# Patient Record
Sex: FEMALE | Race: WHITE | NOT HISPANIC OR LATINO | Employment: FULL TIME | URBAN - METROPOLITAN AREA
[De-identification: names, ages, dates, MRNs, and addresses within clinical notes are randomized per-mention and may not be internally consistent; named-entity substitution may affect disease eponyms.]

---

## 2020-03-04 ENCOUNTER — OFFICE VISIT (OUTPATIENT)
Dept: VASCULAR SURGERY | Facility: CLINIC | Age: 50
End: 2020-03-04
Payer: COMMERCIAL

## 2020-03-04 DIAGNOSIS — I83.812 VARICOSE VEINS OF LEFT LOWER EXTREMITY WITH PAIN: Primary | ICD-10-CM

## 2020-03-04 PROCEDURE — 99207 ZZC VEINSOLUTIONS FREE SCREENING: CPT | Performed by: SURGERY

## 2020-03-04 RX ORDER — ALBUTEROL SULFATE 5 MG/ML
5 SOLUTION RESPIRATORY (INHALATION) EVERY 6 HOURS PRN
COMMUNITY

## 2020-03-04 NOTE — LETTER
3/4/2020         RE: Lety Farley  [address]  [place] [address]        Dear Colleague,    Thank you for referring your patient, Lety Farley, to the [facility]. Please see a copy of my visit note below.    [facility] Free screening    Lety Farley is a 50-year-old female referred by Dr. Erick Adams for evaluation of left leg pain and varicose veins.  Lety has had a visible vein in her left leg since age of 10.  This has enlarged slowly with time and begun to move up her leg.  She describes pain as an aching, tiredness and heaviness as well as numbness, worse when she is been on her feet for long periods of time and improving with elevation of the leg.  She also describes swelling of her left ankle on nearly a daily basis and notes fatigue in her leg.  She has not worn compression hose.    She has no history of deep vein thrombosis, superficial thrombophlebitis or significant trauma to her leg.    Her family history is significant for varicose veins in her mother who underwent vein stripping.  Her sister has varicose veins and had deep vein thrombosis with pulmonary emboli on 2 occasions.  She is now on lifelong anticoagulation.  Her father also has varicose veins but has not had thromboembolic episodes to my knowledge.  He did have arterial insufficiency as he was a smoker and a diabetic.    The patient has been tested and was found to be negative for factor V Leiden mutation.    On physical exam she has a 4 mm varicosity over the medial proximal left calf extending cephalad across the pretibial area, posteriorly to the popliteal fossa and then a smaller varicosity coursing up the posterior lateral left thigh to the mid thigh.    I do not appreciate significant edema of her ankle nor are there any significant venous stasis changes.    She has normal dorsalis pedis and posterior tibial pulses.    Venous clinical severity score 4, CEAP classification  3    Recommendations  We measured her for compression hose, discussed the pathophysiology of chronic venous insufficiency and encouraged conservative measures at this time including exercise, weight loss, dietary measures and leg elevation when possible.  She understands that if she continues to have symptoms despite conservative measures, a left lower extremity venous duplex ultrasound would be indicated.  She voiced understanding, is in agreement with the plan and will contact us if needed in the future.    ELIJAH Farmer MD        [facility] NEW PATIENT:                Again, thank you for allowing me to participate in the care of your patient.        Sincerely,        Kyler Farmer MD

## 2020-03-04 NOTE — PROGRESS NOTES
[facility] Free screening    Lety Farley is a 50-year-old female referred by Dr. Erick Adams for evaluation of left leg pain and varicose veins.  Lety has had a visible vein in her left leg since age of 10.  This has enlarged slowly with time and begun to move up her leg.  She describes pain as an aching, tiredness and heaviness as well as numbness, worse when she is been on her feet for long periods of time and improving with elevation of the leg.  She also describes swelling of her left ankle on nearly a daily basis and notes fatigue in her leg.  She has not worn compression hose.    She has no history of deep vein thrombosis, superficial thrombophlebitis or significant trauma to her leg.    Her family history is significant for varicose veins in her mother who underwent vein stripping.  Her sister has varicose veins and had deep vein thrombosis with pulmonary emboli on 2 occasions.  She is now on lifelong anticoagulation.  Her father also has varicose veins but has not had thromboembolic episodes to my knowledge.  He did have arterial insufficiency as he was a smoker and a diabetic.    The patient has been tested and was found to be negative for factor V Leiden mutation.    On physical exam she has a 4 mm varicosity over the medial proximal left calf extending cephalad across the pretibial area, posteriorly to the popliteal fossa and then a smaller varicosity coursing up the posterior lateral left thigh to the mid thigh.    I do not appreciate significant edema of her ankle nor are there any significant venous stasis changes.    She has normal dorsalis pedis and posterior tibial pulses.    Venous clinical severity score 4, CEAP classification 3    Recommendations  We measured her for compression hose, discussed the pathophysiology of chronic venous insufficiency and encouraged conservative measures at this time including exercise, weight loss, dietary measures and leg elevation when possible.  She  understands that if she continues to have symptoms despite conservative measures, a left lower extremity venous duplex ultrasound would be indicated.  She voiced understanding, is in agreement with the plan and will contact us if needed in the future.    ELIJAH Farmer MD        [facility] NEW PATIENT:

## 2020-10-07 DIAGNOSIS — I83.812 VARICOSE VEINS OF LEFT LOWER EXTREMITY WITH PAIN: Primary | ICD-10-CM

## 2020-10-21 ENCOUNTER — ANCILLARY PROCEDURE (OUTPATIENT)
Dept: ULTRASOUND IMAGING | Facility: CLINIC | Age: 50
End: 2020-10-21
Attending: SURGERY
Payer: COMMERCIAL

## 2020-10-21 DIAGNOSIS — I83.812 VARICOSE VEINS OF LEFT LOWER EXTREMITY WITH PAIN: ICD-10-CM

## 2020-10-21 PROCEDURE — 93971 EXTREMITY STUDY: CPT | Mod: LT | Performed by: SURGERY

## 2020-10-27 ENCOUNTER — OFFICE VISIT (OUTPATIENT)
Dept: VASCULAR SURGERY | Facility: CLINIC | Age: 50
End: 2020-10-27
Payer: COMMERCIAL

## 2020-10-27 DIAGNOSIS — I83.812 VARICOSE VEINS OF LEFT LOWER EXTREMITY WITH PAIN: Primary | ICD-10-CM

## 2020-10-27 PROCEDURE — 99213 OFFICE O/P EST LOW 20 MIN: CPT | Performed by: SURGERY

## 2020-10-27 RX ORDER — DUTASTERIDE 0.5 MG/1
0.5 CAPSULE, LIQUID FILLED ORAL DAILY
COMMUNITY
End: 2022-05-04

## 2020-10-27 NOTE — PROGRESS NOTES
[facility] office note  Lety Farley returns in follow-up of left leg pain and varicose veins.  I last saw her in March 2020 at which time she began wearing compression hose, exercising and trying to lose weight.  Please see that dictation for other details.  The pain when I saw her in March was in the proximal medial left calf extending anteriorly.  Over the last couple of months, however, pain has been located more on the distal anteromedial left leg extending over the dorsum of her left foot.  It seems to be worse after she has sat in one place for long periods of time and improves when she gets up to walk.  She also admits that she has had the appearance of a vein on the anterior aspect of her left leg that has not been there except over the last few months.    She denies any trauma to her leg, erythema, significant swelling or other acute changes.  The pain that she is describing is occurred despite the use of compression hose.    She feels that the pain is significant enough that it is making it difficult for her to get up and walk as she would like and sometimes make it difficult for her to get to sleep.    Physical exam  General: Pleasant female in no acute distress  Extremities: Left lower extremity: There is a 3 to 4 mm varicosity coursing down the lateral left thigh seeming to and near the knee level.  There is a 4 mm varicosity which seem to reside on the anterolateral proximal left leg, extends across the pretibial area, down the anteromedial leg onto the dorsum of the left foot.  There is no erythema or induration along the vein.  There is tenderness over the anterior ankle and dorsum of the left foot along the course of the vein.    She does have a bunion deformity of her left great toe.    She has 2-3+ dorsalis pedis and posterior tibial pulses in her left foot.    Venous ultrasound  Left lower extremity: No evidence of deep vein thrombosis or deep vein valve incompetence.    The left great  saphenous vein is incompetent at the saphenofemoral junction but otherwise is competent.There is a 4.5 mm diameter incompetent vein branch coursing from the below-knee segment of the left great saphenous vein with reflux time of 3.2 seconds.  This vein courses down to the dorsum of the left foot.    The left small saphenous vein is competent.    The left Vein of Giacomini is incompetent but quite small.    The left anterior accessory saphenous vein is incompetent, measures 4.7 mm diameter with reflux time of 6 seconds.  It supplies several incompetent vein branches measuring up to 3.9 mm in diameter with reflux time of 4.7 seconds.    Impression  CEAP 2, VCSS 5 left 4 extremity venous insufficiency.  She has pursued conservative measures for more than 6 months and feels that the pain over the dorsum of the foot is interfering with her actives of daily living, making it difficult for her to be on her feet for long periods of time and interfere with her ability to be as active as she was like and attempts at weight loss.    The vein coursing down the medial left leg onto the dorsum of the foot is the source of the pain on the dorsum of the foot.  I feel this would be best treated with medical necessary phlebectomies.  The origin of this appears to be from a perforating vein on the anterolateral left leg.  This could be treated with ultrasound-guided sclerotherapy at the same setting.    The incompetent left anterior accessory saphenous vein may be contributing somewhat to this venous hypertension but I am not sure that is a major contributor.  I therefore would focus initially on the varicosity on the dorsum of the foot and the feeding, incompetent .    Details of procedure including risks of bleeding, infection, nerve injury, scarring, hyperpigmentation, deep vein thrombosis, allergic reaction were all discussed.  She voiced understanding and wishes to proceed.  She understands that there is a 20% chance  that her pain will not be relieved at that this could be related to the bunion on her left foot.  Questions were answered.    ELIJAH Farmer MD    Dictated using Dragon voice recognition software which may result in transcription errors

## 2020-10-27 NOTE — Clinical Note
10-20 medic necessary phlebectomies left lower extremity. Medically necessary, ultrasound-guided sclerotherapy left leg varicose vein/incompetent perforating vein left anterolateral leg.  Allow 1 hour for procedure

## 2020-10-27 NOTE — LETTER
10/27/2020         RE: Lety Farley  [address]  [place] [address]        Dear Colleague,    Thank you for referring your patient, Lety Farley, to the [facility] [place]. Please see a copy of my visit note below.    [facility] office note  Lety Farley returns in follow-up of left leg pain and varicose veins.  I last saw her in March 2020 at which time she began wearing compression hose, exercising and trying to lose weight.  Please see that dictation for other details.  The pain when I saw her in March was in the proximal medial left calf extending anteriorly.  Over the last couple of months, however, pain has been located more on the distal anteromedial left leg extending over the dorsum of her left foot.  It seems to be worse after she has sat in one place for long periods of time and improves when she gets up to walk.  She also admits that she has had the appearance of a vein on the anterior aspect of her left leg that has not been there except over the last few months.    She denies any trauma to her leg, erythema, significant swelling or other acute changes.  The pain that she is describing is occurred despite the use of compression hose.    She feels that the pain is significant enough that it is making it difficult for her to get up and walk as she would like and sometimes make it difficult for her to get to sleep.    Physical exam  General: Pleasant female in no acute distress  Extremities: Left lower extremity: There is a 3 to 4 mm varicosity coursing down the lateral left thigh seeming to and near the knee level.  There is a 4 mm varicosity which seem to reside on the anterolateral proximal left leg, extends across the pretibial area, down the anteromedial leg onto the dorsum of the left foot.  There is no erythema or induration along the vein.  There is tenderness over the anterior ankle and dorsum of the left foot along the course of the vein.    She does have a  bunion deformity of her left great toe.    She has 2-3+ dorsalis pedis and posterior tibial pulses in her left foot.    Venous ultrasound  Left lower extremity: No evidence of deep vein thrombosis or deep vein valve incompetence.    The left great saphenous vein is incompetent at the saphenofemoral junction but otherwise is competent.There is a 4.5 mm diameter incompetent vein branch coursing from the below-knee segment of the left great saphenous vein with reflux time of 3.2 seconds.  This vein courses down to the dorsum of the left foot.    The left small saphenous vein is competent.    The left Vein of Giacomini is incompetent but quite small.    The left anterior accessory saphenous vein is incompetent, measures 4.7 mm diameter with reflux time of 6 seconds.  It supplies several incompetent vein branches measuring up to 3.9 mm in diameter with reflux time of 4.7 seconds.    Impression  CEAP 2, VCSS 5 left 4 extremity venous insufficiency.  She has pursued conservative measures for more than 6 months and feels that the pain over the dorsum of the foot is interfering with her actives of daily living, making it difficult for her to be on her feet for long periods of time and interfere with her ability to be as active as she was like and attempts at weight loss.    The vein coursing down the medial left leg onto the dorsum of the foot is the source of the pain on the dorsum of the foot.  I feel this would be best treated with medical necessary phlebectomies.  The origin of this appears to be from a perforating vein on the anterolateral left leg.  This could be treated with ultrasound-guided sclerotherapy at the same setting.    The incompetent left anterior accessory saphenous vein may be contributing somewhat to this venous hypertension but I am not sure that is a major contributor.  I therefore would focus initially on the varicosity on the dorsum of the foot and the feeding, incompetent .    Details of  procedure including risks of bleeding, infection, nerve injury, scarring, hyperpigmentation, deep vein thrombosis, allergic reaction were all discussed.  She voiced understanding and wishes to proceed.  She understands that there is a 20% chance that her pain will not be relieved at that this could be related to the bunion on her left foot.  Questions were answered.    ELIJAH Farmer MD    Dictated using Dragon voice recognition software which may result in transcription errors          Again, thank you for allowing me to participate in the care of your patient.        Sincerely,        Kyler Farmer MD

## 2020-12-20 ENCOUNTER — HEALTH MAINTENANCE LETTER (OUTPATIENT)
Age: 50
End: 2020-12-20

## 2021-08-04 DIAGNOSIS — I83.812 VARICOSE VEINS OF LEFT LOWER EXTREMITY WITH PAIN: Primary | ICD-10-CM

## 2021-08-12 ENCOUNTER — ANCILLARY PROCEDURE (OUTPATIENT)
Dept: ULTRASOUND IMAGING | Facility: CLINIC | Age: 51
End: 2021-08-12
Attending: SURGERY
Payer: COMMERCIAL

## 2021-08-12 DIAGNOSIS — I83.812 VARICOSE VEINS OF LEFT LOWER EXTREMITY WITH PAIN: ICD-10-CM

## 2021-08-12 PROCEDURE — 93971 EXTREMITY STUDY: CPT | Mod: LT | Performed by: SURGERY

## 2021-08-26 ENCOUNTER — VIRTUAL VISIT (OUTPATIENT)
Dept: VASCULAR SURGERY | Facility: CLINIC | Age: 51
End: 2021-08-26
Attending: SURGERY
Payer: COMMERCIAL

## 2021-08-26 DIAGNOSIS — I83.812 VARICOSE VEINS OF LEFT LOWER EXTREMITY WITH PAIN: Primary | ICD-10-CM

## 2021-08-26 PROCEDURE — 99213 OFFICE O/P EST LOW 20 MIN: CPT | Mod: 95 | Performed by: SURGERY

## 2021-08-26 NOTE — LETTER
8/26/2021         RE: Lety Farley  [address]  [place] [address]        Dear Colleague,    Thank you for referring your patient, Lety Farley, to the [facility] [place]. Please see a copy of my visit note below.    Cecilia is a 51 year old who is being evaluated via a billable video visit.      How would you like to obtain your AVS? MyChart  If the video visit is dropped, the invitation should be resent by: Text to cell phone: [phone]  Will anyone else be joining your video visit? No      Video Start Time: 4:01 PM          Video-Visit Details    Type of service:  Video Visit    Video End Time:4:12 PM    Originating Location (pt. Location): Home    Distant Location (provider location):  [facility]     Platform used for Video Visit: [employer]     [facility] [place] Progress Note    Lety Farley presents in follow-up of left lower extremity pain and varicose veins. Please see my clinic notes of 10/27/2020 and 3/4/2020 for details. We had planned procedure to treat her left lower extremity varicosities but she lost her insurance for period of time and had to postpone treatment. Because her last ultrasound was more than 6 months ago, she returned on 8/12/2021 for left lower extremity venous competency study, the results of which we will discuss on today's video visit.    Physical Exam  General: Pleasant female in no acute distress.  Blood pressure 156/93, pulse 91  Extremities: Left lower extremity: 3 to 4 mm varicosity coursing from the anterolateral left thigh, lateral to the left knee. 4 mm varicosity coursing from the proximal anterolateral left calf extending cephalad across the pretibial area, posteriorly to the popliteal fossa and then a smaller varicosity coursing up the posterior lateral left thigh to the mid thigh. There is a varicosity on the medial calf coursing down the pretibial area to the dorsum of  the left foot.  Trace edema of the left ankle. No stasis changes    Ultrasound:  Left lower extremity: No deep vein thrombosis or deep vein valve incompetence.  Left great saphenous vein is incompetent at the saphenofemoral junction and in the mid thigh, measures 5.4 mm in diameter with reflux time of 1.6 seconds. Is a source of a 4.2 mm diameter incompetent vein branch coursing from the mid thigh with reflux time of 4.7 seconds.  The left small saphenous vein is incompetent in the proximal calf but otherwise is competent. It is quite small.  The left anterior accessory saphenous vein is incompetent, measures 6 mm in diameter with reflux time of 5.4 seconds. Is a source of 4.8 Demeter diameter incompetent vein branch with reflux time of 4.8 seconds.  There are no significant incompetent perforators.    Assessment:  Left leg pain and varicose veins with symptoms that have persisted despite the use of compression hose, leg elevation, dietary measures and exercise for more than 6 months. She feels the symptoms interfere with actives daily living, making it difficult to be on her feet for long periods of time, especially toward the end of the day.    The option of continued conservative measures as above were discussed. She does not feel this can be a good option for her and she wishes to have definitive treatment.    She is a candidate for endovenous ablation of the left great saphenous and accessory saphenous veins with phlebectomies. Details procedure including risks of bleeding, infection, nerve injury, scarring, hyperpigmentation, deep vein thrombosis, recanalization of the great saphenous or anterior sensory saphenous veins and recurrent varicose veins were discussed. She voiced understanding and her questions were answered.    Plan:  Please see above    Kyler Farmer MD    Dictated using Dragon voice recognition software which may result in transcription errors              Again, thank you for  allowing me to participate in the care of your patient.        Sincerely,        Kyler Farmer MD

## 2021-08-26 NOTE — PROGRESS NOTES
Cecilia is a 51 year old who is being evaluated via a billable video visit.      How would you like to obtain your AVS? MyChart  If the video visit is dropped, the invitation should be resent by: Text to cell phone: [phone]  Will anyone else be joining your video visit? No      Video Start Time: 4:01 PM          Video-Visit Details    Type of service:  Video Visit    Video End Time:4:12 PM    Originating Location (pt. Location): Home    Distant Location (provider location):  [facility] [place]     Platform used for Video Visit: [employer]     [facility] [place] Progress Note    Lety Farley presents in follow-up of left lower extremity pain and varicose veins. Please see my clinic notes of 10/27/2020 and 3/4/2020 for details. We had planned procedure to treat her left lower extremity varicosities but she lost her insurance for period of time and had to postpone treatment. Because her last ultrasound was more than 6 months ago, she returned on 8/12/2021 for left lower extremity venous competency study, the results of which we will discuss on today's video visit.    Physical Exam  General: Pleasant female in no acute distress.  Blood pressure 156/93, pulse 91  Extremities: Left lower extremity: 3 to 4 mm varicosity coursing from the anterolateral left thigh, lateral to the left knee. 4 mm varicosity coursing from the proximal anterolateral left calf extending cephalad across the pretibial area, posteriorly to the popliteal fossa and then a smaller varicosity coursing up the posterior lateral left thigh to the mid thigh. There is a varicosity on the medial calf coursing down the pretibial area to the dorsum of the left foot.  Trace edema of the left ankle. No stasis changes    Ultrasound:  Left lower extremity: No deep vein thrombosis or deep vein valve incompetence.  Left great saphenous vein is incompetent at the saphenofemoral junction and in the mid thigh, measures  5.4 mm in diameter with reflux time of 1.6 seconds. Is a source of a 4.2 mm diameter incompetent vein branch coursing from the mid thigh with reflux time of 4.7 seconds.  The left small saphenous vein is incompetent in the proximal calf but otherwise is competent. It is quite small.  The left anterior accessory saphenous vein is incompetent, measures 6 mm in diameter with reflux time of 5.4 seconds. Is a source of 4.8 Demeter diameter incompetent vein branch with reflux time of 4.8 seconds.  There are no significant incompetent perforators.    Assessment:  Left leg pain and varicose veins with symptoms that have persisted despite the use of compression hose, leg elevation, dietary measures and exercise for more than 6 months. She feels the symptoms interfere with actives daily living, making it difficult to be on her feet for long periods of time, especially toward the end of the day.    The option of continued conservative measures as above were discussed. She does not feel this can be a good option for her and she wishes to have definitive treatment.    She is a candidate for endovenous ablation of the left great saphenous and accessory saphenous veins with phlebectomies. Details procedure including risks of bleeding, infection, nerve injury, scarring, hyperpigmentation, deep vein thrombosis, recanalization of the great saphenous or anterior sensory saphenous veins and recurrent varicose veins were discussed. She voiced understanding and her questions were answered.    Plan:  Please see above    Kyler Farmer MD    Dictated using Dragon voice recognition software which may result in transcription errors

## 2021-09-01 ENCOUNTER — TELEPHONE (OUTPATIENT)
Dept: VASCULAR SURGERY | Facility: CLINIC | Age: 51
End: 2021-09-01

## 2021-09-01 NOTE — TELEPHONE ENCOUNTER
Called and LDVM regarding process of insurance submission. Informed patient this process could take up to 14 business days, but once approved, you will be contacted to schedule your procedure.    Further documentation of this process will be documented in the referral.    Madiha Nieves  [facility]

## 2021-09-10 DIAGNOSIS — I83.812 VARICOSE VEINS OF LEFT LOWER EXTREMITY WITH PAIN: Primary | ICD-10-CM

## 2021-09-28 ENCOUNTER — TELEPHONE (OUTPATIENT)
Dept: VASCULAR SURGERY | Facility: CLINIC | Age: 51
End: 2021-09-28

## 2021-09-28 DIAGNOSIS — I83.812 VARICOSE VEINS OF LEFT LOWER EXTREMITY WITH PAIN: Primary | ICD-10-CM

## 2021-09-28 NOTE — TELEPHONE ENCOUNTER
[facility] Clinic Preoperative Nurse Call    Procedure: Left leg VNUS closure GSV, ASV (med nec) 10-20 stab phlebs (med nec)  Date: 10/7/2021  Surgeon: ELIJAH Farmer MD  Time: 0900  Check in time: 0800    Called and spoke to patient. Informed patient: when to check in (0800) to sign consent, to bring their preop medications in their original bottle with them (3mg ativan, 0.1mg clonidine). Patient will take the medications after signing the consent to the procedure. Instructed patient to wear a mask, wear loose-fitting comfortable clothing, and bring their compression hose. Ensured patient has a /someone that will be responsible for them the rest of the day. Visitors are allowed in the clinic, but they would need to stay in an exam room or wait in the parking lot or leave. If  does leave, IF POSSIBLE, we ask that they do not go more than 15-20 mins from our clinic. Once procedure is completed, we will keep patient in recovery for 30-45 mins, and call  with aftercare instructions. Informed patient, that if possible, they should sit in the backseat to elevate their leg on the ride home.    Pt needs thigh-high compression hose for procedure. Status of the hose: patient is checking with insurance and is asking for dx code.Code given to patient . Instructed pt she will need to have compression hose for Saturday (48 hour after procedure)    Special instructions: none     Special COVID-19 instructions: Patient aware they need to wear a mask to our clinic and during their procedure. Patient aware that their  can come in with them, but would need to stay in an exam room during the procedure or wait in the parking lot or leave. Nurse will call pt's  when procedure is over.    Patient understands that if they have any of the following symptoms (fever, cough, shortness of breath, rash), they need to notify us immediately to cancel their procedure and will have to reschedule for a later  date.    Patient is in agreement with preoperative information and has no further questions.    Victoriano Renteria, RN  9/28/2021

## 2021-09-29 ENCOUNTER — TELEPHONE (OUTPATIENT)
Dept: VASCULAR SURGERY | Facility: CLINIC | Age: 51
End: 2021-09-29

## 2021-09-29 DIAGNOSIS — I83.812 VARICOSE VEINS OF LEFT LOWER EXTREMITY WITH PAIN: Primary | ICD-10-CM

## 2021-09-29 RX ORDER — CLONIDINE HYDROCHLORIDE 0.1 MG/1
TABLET ORAL
Qty: 1 TABLET | Refills: 0 | Status: SHIPPED | OUTPATIENT
Start: 2021-09-29 | End: 2021-10-11

## 2021-09-29 RX ORDER — LORAZEPAM 1 MG/1
TABLET ORAL
Qty: 3 TABLET | Refills: 0 | Status: SHIPPED | OUTPATIENT
Start: 2021-09-29 | End: 2021-10-11

## 2021-09-29 NOTE — TELEPHONE ENCOUNTER
See other phone note from 9/29/21 - faxed pt's compression hose Rx to [facility] per pt request.    Kathy Mcguire, NIKAN, RN  [facility]  Vein Clinic

## 2021-09-29 NOTE — TELEPHONE ENCOUNTER
Patient checked with her insurance company. She wants to order two pair of thigh high hose,beige. Open toe. Having surgery on 10/07/21 at . Thanks

## 2021-09-29 NOTE — TELEPHONE ENCOUNTER
Per pt request, faxed their compression hose Rx to [facility] at [phone].   Pt would like 2 pair(s) of natural, open-toe, thigh high, 20-30mmhg compression hose. Fax confirmed.          Pt aware they will be shipped to their home address.    Kathy Mcguire RN

## 2021-09-29 NOTE — TELEPHONE ENCOUNTER
Called pt back and informed her per Juju at the [facility], pt needs custom thigh high stockings and she could order the [employer] custom stockings but they wouldn't get to pt for about 2-3 weeks. Per pt, she is okay to order a pair but ONLY 1 PAIR of the thigh high stockings so she has them at home even though she won't get them in time for her procedure. And then pt stated her sister has a couple thigh high hose that may fit her so she will look into that for after the procedure when the ACE wrap is off. Otherwise, informed pt that she can re-use the ACE wrap - as last resort - from toes to groin.    Pt in agreement with plan and will discuss further with nurse on the day of procedure. Pt does not have any other questions at this time.    Kathy Mcguire, NIKAN, RN  [facility]  Vein Clinic

## 2021-10-03 ENCOUNTER — HEALTH MAINTENANCE LETTER (OUTPATIENT)
Age: 51
End: 2021-10-03

## 2021-10-07 ENCOUNTER — OFFICE VISIT (OUTPATIENT)
Dept: VASCULAR SURGERY | Facility: CLINIC | Age: 51
End: 2021-10-07
Payer: COMMERCIAL

## 2021-10-07 VITALS — DIASTOLIC BLOOD PRESSURE: 52 MMHG | SYSTOLIC BLOOD PRESSURE: 118 MMHG | OXYGEN SATURATION: 94 % | HEART RATE: 99 BPM

## 2021-10-07 DIAGNOSIS — I83.812 VARICOSE VEINS OF LEFT LOWER EXTREMITY WITH PAIN: Primary | ICD-10-CM

## 2021-10-07 PROCEDURE — 36476 ENDOVENOUS RF VEIN ADD-ON: CPT | Mod: LT | Performed by: SURGERY

## 2021-10-07 PROCEDURE — 37765 STAB PHLEB VEINS XTR 10-20: CPT | Mod: LT | Performed by: SURGERY

## 2021-10-07 PROCEDURE — 36475 ENDOVENOUS RF 1ST VEIN: CPT | Mod: LT | Performed by: SURGERY

## 2021-10-07 RX ORDER — LISINOPRIL 5 MG/1
5 TABLET ORAL DAILY
COMMUNITY

## 2021-10-07 RX ORDER — HYDROCODONE BITARTRATE AND ACETAMINOPHEN 5; 325 MG/1; MG/1
1 TABLET ORAL EVERY 6 HOURS PRN
Qty: 2 TABLET | Refills: 0 | Status: SHIPPED | OUTPATIENT
Start: 2021-10-07 | End: 2021-10-11

## 2021-10-07 NOTE — PATIENT INSTRUCTIONS
Post-Procedure Instructions:             VNUS Closure and Phlebectomies      Post-Op Day Zero - The Day of Your Procedure:0  1. Medication for Pain Control and Inflammation Control   - The numbing medication injected during your procedure will last for several hours. The pre-procedure                 tablets may make you very sleepy and you might not remember everything from the procedure or from                 the day. This will usually wear off by the next day.   - Ibuprofen:  If tolerated, take ibuprofen (e.g., Advil) to reduce inflammation whether or not you have                 pain. For three days, take two tablets (200mg each) with every meal and at bedtime with a snack. If                 your pain is not controlled with ibuprofen, you may take prescription pain medication (such as Norco),                 if prescribed.   - You may resume taking any medications you were taking before your procedure.  2. Activity   - Rest with your leg(s) elevated above your heart. This will prevent from a lot of swelling and                 bleeding. You do not need to elevate your leg(s) while sleeping at night. You may go upstairs, sit up to                 eat, use the bathroom, and take several five minute walks. Otherwise, keep your leg(s) elevated.                 Minimize the amount of time you are up on your feet to about 30 minutes at a time.  3. Bandages   - The incision sites will be covered with soft bandages and an ACE wrap. Keep your bandages on and    dry for 48 hours. The ACE should provide  snug  compression, but should not cause pain or numbness    in the toes. If you have significant discomfort or your toes become cold or numb, unwrap your ACE and    rewrap with less tension starting at the toes wrapping upward.  4. Incisions   - Bleeding: You may see some incision sites that are oozing through the bandages. This is not unusual    and can be managed with Rest, Ice, Compression and Elevation (RICE). Apply  ice and firm pressure    directly to the site that is bleeding and rest with your leg(s) elevated above your heart for 20-30 minutes.    Post-Op Day One:  1. Medication   - Ibuprofen:  Continue the same as the Day of Your Procedure. If your pain is not controlled with    ibuprofen, you may take prescription pain medication (such as Norco), if prescribed.   2. Activity   - We would like you to get up at least six times and walk around for short periods of time, unless it is    causing you pain. You should not be on your feet more than 90 minutes at a time. Elevate your leg    above your heart when you are not walking.  3. Bandages   - Your bandages must be kept on and dry for 48 hours.  4. Driving   - You may resume driving when you can do so safely. Do not drive if you are taking narcotic pain    medication.  Post-Op Day Two:   1. Medication  - Ibuprofen: Continue the same as the Day of Your Procedure.  2.  Activity   - Walk as tolerated. Elevate as much as possible when not walking.  3. Bandages and Compression  - Remove ACE wrap and padding. Shower and put on your compression hose during waking hours only for at least 5 days. (Your doctor may instruct you to keep your bandages on until your return appointment; please follow your doctor's instructions.)  4. Incisions   - Your leg(s) will be bruised; there may be swelling, hard knots under the skin and possibly some    numbness. These will likely resolve over time. If you see  hair-like  strings coming out of your    incisions, do not pull them (this will only cause pain/discomfort). We will trim them when you come   back for your follow-up appointment.  5. Call Us If:   - You see any areas on your leg that are red and angry in appearance.   - You notice any drainage that is milky or cloudy in appearance or that has a foul odor.   - You run a temperature of 100.5 or greater.    Post-Op Day Three:  Your follow up appointment is:  ______________________________________________    At this appointment, you will have an ultrasound and we will check your incisions. If your doctor is not scheduled to be in the clinic at the time of your appointment, you will be seen by a different doctor or nurse.  __________________________________________________________________________________________  The Two Weeks Following Your Procedure  1.  Skin Care   - Do not use any lotions, creams or powders on your leg for 14 days or until the incisions have healed.   - Do not soak in a bathtub, whirlpool, or hot tub or go swimming for 14 days or until your incisions have  healed.  2.  Medications   - You may use ibuprofen or acetaminophen (e.g., Tylenol) as needed for pain or discomfort.  3.  Activity   - Do not lift over 25 pounds. After about two weeks you may resume exercise such as aerobics, running,    tennis or weight lifting. Use your common sense and ease back into your exercise routine slowly.   - You may feel a cord-like tightness along the inside of your leg. Gentle stretching can be helpful.  4. Compression Hose   - Your doctor may instruct you to wear compression for longer than seven days; please    follow your doctor's instructions. As a comfort measure, you may choose to wear compression for    longer than required.  5.  Travel   - Do not fly in an airplane for 14 days after your procedure. If you have a long car trip planned within    two to three weeks following your procedure, stop and walk for a few minutes every two hours.    Periodic ankle pumps during the ride may be helpful.    Six Week Appointment   At your six week appointment, you will see your surgeon for an exam and evaluation. This office visit    will be scheduled when you return for Post-op Day Three Return Appointment.     Return to Work  1.  If you work outside the home, you may return to work in a few days depending on the extent of your procedure, how you tolerate it, and the type  of work you perform.  2.  Paperwork: If your employer requires paperwork or you would like a letter written to your employer, please let us know. We will complete disability type forms at no charge. Please allow five business days for forms to be completed.

## 2021-10-07 NOTE — LETTER
10/7/2021         RE: Lety Farley  [address]  [place] [address]        Dear Colleague,    Thank you for referring your patient, Lety Farley, to the [facility]IN [facility] [place]. Please see a copy of my visit note below.            Vein Clinic Procedure Note    Indications:  Chronic left leg pain and varicose veins with symptoms recalcitrant to conservative measures    Procedure:  1. Radiofrequency ablation left great saphenous vein  2. Radiofrequency ablation left anterior accessory saphenous vein  3. Multiple medically necessary phlebectomies left lower extremity (20 stabs)    Surgeon  MARY Farmer MD    Procedure Description  Details of the procedure including risks of bleeding, infection, nerve injury, scarring, hyperpigmentation, deep vein thrombosis, recanalization of the great saphenous or left anterior accessory saphenous veins and recurrent varicose veins all discussed.  The patient voiced understanding and wished to proceed.  Informed consent was obtained.     I had the patient stand and marked varicosities coursing from the proximal anterior left thigh, down the lateral thigh, lateral to the knee wrapping around the anterior aspect of her left leg and onto the medial left leg. She also varicosity extending down the distal anterior left leg onto the dorsum of her left foot which were also marked. With indelible marker.  We then proceeded the operating room, had the patient lie supine on the operating table, then prepped and draped the left lower extremity sterilely.    We took a timeout to confirm the appropriate operative site and procedure: Radiofrequency ablation left great saphenous vein, left anterior accessory saphenous vein and multiple medic necessary phlebectomies left lower extremity    VNUS Closure  I imaged the left lower extremity easily identifying the left great saphenous vein.  I infiltrated the skin overlying the vein with 1% lidocaine with  bicarbonate, placed a micropuncture needle into the vein followed by a micropuncture guidewire and a 7 Malian sheath.    I then imaged the left anterior thigh, placing a micropuncture needle into the left anterior accessory saphenous vein, leaving the guidewire in place.    We passed the closure fast device through the sheath into the great saphenous vein, positioning of the tip of the device 2.9 centimeters from the saphenofemoral junction under ultrasound guidance with the operating table in Trendelenburg position.  Tumescent anesthetic was injected along the course of the vein under ultrasound guidance with care to confirm catheter tip position 2.9 cm from the saphenofemoral junction prior to infiltrating the tissues in this location.  The same tumescent anesthetic was injected around each of the marked varicosities.    After allowing the block to take effect and after confirming appropriate position, I applied compression to the anterior proximal thigh great saphenous vein with the ultrasound probe and additional width 2 fingerbreadths treating the first two 7 cm increments of the vein with 2 RF sessions each.  The remaining vein was treated with 1 RF session to each 7cm increment with the exception of segments of vein where energy readings were higher requiring additional treatments.  We treated down to just cephalad of the mid calf.  The patient was comfortable throughout.    After completing the pullback, I reimaged the vein and the vein to be non-compressible and closed.  The saphenofemoral junction and common femoral veins were fully compressible and free of thrombus. The sheath and the catheter were removed and hemostasis secured with pressure.    I then passed a seven Malian sheath over the guidewire into the left anterior accessory saphenous vein. There was tortuosity in the proximal third of the anterior sensory saphenous vein which could not be traversed after passing the closure fast device after  multiple attempts. I therefore passed a 035 steerable guidewire and was able to manipulate the guidewire through the area of tortuosity followed by passage of the closure fast device, positioning the tip of the device 2.5 cm from the saphenofemoral junction under ultrasound guidance. Tumescent acetic was then injected along the course of the anterior chest or saphenous vein the block allowed to take effect. We then treated the two increments of the anterior accessory saphenous vein with two RF sessions each. The patient was comfortable throughout. I reimaged the vein, and noted that it was closed and also noted that the saphenofemoral junction and common femoral veins are fully compressible and free of thrombus. Sheath and the catheter removed and hemostasis secured with pressure.    Phlebectomies  We made stab wounds beside each of the marked varicosities with 11 scalpel, retrieved the veins with either vein hooks or libia hooks, clamped them with mosquito clamps and a avulsed them.  Hemostasis was secured with pressure.    After completing the phlebectomies, the leg was cleaned with saline solution and petroleum jelly was applied to the skin.  The leg was then dressed with ABD pads, cast padding and an Ace bandage from the toes to the groin.   We observed the patient for 30 minutes to ensure excellent hemostasis then took her to her car in a wheelchair.  Post procedure instructions were given to the patient and her sister in verbal and written form.  They both voiced understanding and their questions were answered.    The patient tolerated the procedure well without evidence of allergic reaction or other complications and will return in 72 hours for a left lower extremity venous ultrasound.    [place] Closure    Date/Time: 10/7/2021 10:25 AM  Performed by: Kyler Farmer MD  Authorized by: Kyler Farmer MD     Time out: Immediately prior to the procedure a time out was called     Preparation: Patient was prepped and draped in usual sterile fashion    1st Assist:  Velma Whelan, CST/CSFA  Circulator:  Victoriano Renteria RN  Procedure:  VNUS  Procedure side:  Left  Vein Treated:  GSV  Patient tolerance:  Patient tolerated the procedure well with no immediate complications  [place] Closure    Date/Time: 10/7/2021 10:25 AM  Performed by: Kyler Farmer MD  Authorized by: Kyler Farmer MD     Procedure:  VNUS  Procedure side:  Left  Second and Subsequent Vein    Vein Treated:  ASV  Patient tolerance:  Patient tolerated the procedure well with no immediate complications  Phlebectomy    Date/Time: 10/7/2021 10:25 AM  Performed by: Kyler Farmer MD  Authorized by: Kyler Farmer MD     Procedure:  Phlebectomies  Type:  Medically Necessary  Procedure side:  Left  Stabs:  10-20  Patient tolerance:  Patient tolerated the procedure well with no immediate complications  Wrap/Hose:  Wraps        Flowsheet Data 10/7/2021   Procedure Start Time:  9:06 AM   Prep: Chloraprep   Side: Left   Tx Length (cm): LEFT ASV:12   Junction (cm): LEFT ASV:2.50   RF Cycles: LEFT ASV:4   RF TX Time (Minutes): LEFT ASV:1:20   How many additional vein treatments are being performed? 1   Tx Length (cm) - 2: LEFT GSV:49.5   Junction 2 (cm): LEFT GSV: 2.90   RF Cycles 2 : LEFT GSV:10   RF TX Time (Minutes) 2: LEFT GSV:3:20   # PHLEB Sites: LEFT 20   Sedation taken: Yes   Pre Pt. Physical / Cognitive Limitations: WNL   TOTAL Local anesthesia Injected (ml): 6.5   Max Volume Local Anesthesia (ml): 11   TOTAL Tumescent Injected volume (ml): 565   Max Volume Tumescent (ml): 572   Post Pt. Physical / Cognitive Limitations: WNL   Procedure End Time: 10:22 AM   D/C Instructions given, states readiness to leave and escorted to car: Yes       ELIJAH Farmer MD    Dictated using Dragon voice recognition software which may result in transcription errorsPre-procedure Nursing Note    Lety  FELIPA Farley presents to clinic for Vein Procedure  .   /Person Responsible for Patient: Edita (sister)  Phone Number: [phone]    Prophylactic Medication:N/A   Sedation Medication: Ativan, 3mg ,   Time Taken: 0748 and Clonidine, 0.1mg  Time Taken: 0748  Compression Stockings: Hose at home  The procedure is being performed on LLE.  Patient understanding of procedure matches consent? YES    Patient's pre-procedure medications verified by Victoriano Renteria RN  .    Victoriano Renteria RN on 10/7/2021 at 7:43 AM      Again, thank you for allowing me to participate in the care of your patient.        Sincerely,        Kyler Farmer MD

## 2021-10-07 NOTE — PROGRESS NOTES
Vein Clinic Procedure Note    Indications:  Chronic left leg pain and varicose veins with symptoms recalcitrant to conservative measures    Procedure:  1. Radiofrequency ablation left great saphenous vein  2. Radiofrequency ablation left anterior accessory saphenous vein  3. Multiple medically necessary phlebectomies left lower extremity (20 stabs)    Surgeon  MARY Farmer MD    Procedure Description  Details of the procedure including risks of bleeding, infection, nerve injury, scarring, hyperpigmentation, deep vein thrombosis, recanalization of the great saphenous or left anterior accessory saphenous veins and recurrent varicose veins all discussed.  The patient voiced understanding and wished to proceed.  Informed consent was obtained.     I had the patient stand and marked varicosities coursing from the proximal anterior left thigh, down the lateral thigh, lateral to the knee wrapping around the anterior aspect of her left leg and onto the medial left leg. She also varicosity extending down the distal anterior left leg onto the dorsum of her left foot which were also marked. With indelible marker.  We then proceeded the operating room, had the patient lie supine on the operating table, then prepped and draped the left lower extremity sterilely.    We took a timeout to confirm the appropriate operative site and procedure: Radiofrequency ablation left great saphenous vein, left anterior accessory saphenous vein and multiple medic necessary phlebectomies left lower extremity    VNUS Closure  I imaged the left lower extremity easily identifying the left great saphenous vein.  I infiltrated the skin overlying the vein with 1% lidocaine with bicarbonate, placed a micropuncture needle into the vein followed by a micropuncture guidewire and a 7 Wolof sheath.    I then imaged the left anterior thigh, placing a micropuncture needle into the left anterior accessory saphenous vein, leaving the guidewire in  place.    We passed the closure fast device through the sheath into the great saphenous vein, positioning of the tip of the device 2.9 centimeters from the saphenofemoral junction under ultrasound guidance with the operating table in Trendelenburg position.  Tumescent anesthetic was injected along the course of the vein under ultrasound guidance with care to confirm catheter tip position 2.9 cm from the saphenofemoral junction prior to infiltrating the tissues in this location.  The same tumescent anesthetic was injected around each of the marked varicosities.    After allowing the block to take effect and after confirming appropriate position, I applied compression to the anterior proximal thigh great saphenous vein with the ultrasound probe and additional width 2 fingerbreadths treating the first two 7 cm increments of the vein with 2 RF sessions each.  The remaining vein was treated with 1 RF session to each 7cm increment with the exception of segments of vein where energy readings were higher requiring additional treatments.  We treated down to just cephalad of the mid calf.  The patient was comfortable throughout.    After completing the pullback, I reimaged the vein and the vein to be non-compressible and closed.  The saphenofemoral junction and common femoral veins were fully compressible and free of thrombus. The sheath and the catheter were removed and hemostasis secured with pressure.    I then passed a seven Macedonian sheath over the guidewire into the left anterior accessory saphenous vein. There was tortuosity in the proximal third of the anterior sensory saphenous vein which could not be traversed after passing the closure fast device after multiple attempts. I therefore passed a 035 steerable guidewire and was able to manipulate the guidewire through the area of tortuosity followed by passage of the closure fast device, positioning the tip of the device 2.5 cm from the saphenofemoral junction under  ultrasound guidance. Tumescent acetic was then injected along the course of the anterior chest or saphenous vein the block allowed to take effect. We then treated the two increments of the anterior accessory saphenous vein with two RF sessions each. The patient was comfortable throughout. I reimaged the vein, and noted that it was closed and also noted that the saphenofemoral junction and common femoral veins are fully compressible and free of thrombus. Sheath and the catheter removed and hemostasis secured with pressure.    Phlebectomies  We made stab wounds beside each of the marked varicosities with 11 scalpel, retrieved the veins with either vein hooks or libia hooks, clamped them with mosquito clamps and a avulsed them.  Hemostasis was secured with pressure.    After completing the phlebectomies, the leg was cleaned with saline solution and petroleum jelly was applied to the skin.  The leg was then dressed with ABD pads, cast padding and an Ace bandage from the toes to the groin.   We observed the patient for 30 minutes to ensure excellent hemostasis then took her to her car in a wheelchair.  Post procedure instructions were given to the patient and her sister in verbal and written form.  They both voiced understanding and their questions were answered.    The patient tolerated the procedure well without evidence of allergic reaction or other complications and will return in 72 hours for a left lower extremity venous ultrasound.    [place] Closure    Date/Time: 10/7/2021 10:25 AM  Performed by: Kyler Farmer MD  Authorized by: Kyler Farmer MD     Time out: Immediately prior to the procedure a time out was called    Preparation: Patient was prepped and draped in usual sterile fashion    1st Assist:  Velma Whelan CST/LINDY  Circulator:  Victoriano Renteria RN  Procedure:  VNUS  Procedure side:  Left  Vein Treated:  GSV  Patient tolerance:  Patient tolerated the procedure well with no  immediate complications  [place] Closure    Date/Time: 10/7/2021 10:25 AM  Performed by: Kyler Farmer MD  Authorized by: Kyler Farmer MD     Procedure:  VNUS  Procedure side:  Left  Second and Subsequent Vein    Vein Treated:  ASV  Patient tolerance:  Patient tolerated the procedure well with no immediate complications  Phlebectomy    Date/Time: 10/7/2021 10:25 AM  Performed by: Kyler Farmer MD  Authorized by: Kyler Farmer MD     Procedure:  Phlebectomies  Type:  Medically Necessary  Procedure side:  Left  Stabs:  10-20  Patient tolerance:  Patient tolerated the procedure well with no immediate complications  Wrap/Hose:  Wraps        Flowsheet Data 10/7/2021   Procedure Start Time:  9:06 AM   Prep: Chloraprep   Side: Left   Tx Length (cm): LEFT ASV:12   Junction (cm): LEFT ASV:2.50   RF Cycles: LEFT ASV:4   RF TX Time (Minutes): LEFT ASV:1:20   How many additional vein treatments are being performed? 1   Tx Length (cm) - 2: LEFT GSV:49.5   Junction 2 (cm): LEFT GSV: 2.90   RF Cycles 2 : LEFT GSV:10   RF TX Time (Minutes) 2: LEFT GSV:3:20   # PHLEB Sites: LEFT 20   Sedation taken: Yes   Pre Pt. Physical / Cognitive Limitations: WNL   TOTAL Local anesthesia Injected (ml): 6.5   Max Volume Local Anesthesia (ml): 11   TOTAL Tumescent Injected volume (ml): 565   Max Volume Tumescent (ml): 572   Post Pt. Physical / Cognitive Limitations: WNL   Procedure End Time: 10:22 AM   D/C Instructions given, states readiness to leave and escorted to car: Yes       ELIJAH Farmer MD    Dictated using Dragon voice recognition software which may result in transcription errorsPre-procedure Nursing Note    Lety Farley presents to clinic for Vein Procedure  .   /Person Responsible for Patient: Edita (sister)  Phone Number: [phone]    Prophylactic Medication:N/A   Sedation Medication: Ativan, 3mg ,   Time Taken: 0748 and Clonidine, 0.1mg  Time Taken:  0748  Compression Stockings: Hose at home  The procedure is being performed on LLE.  Patient understanding of procedure matches consent? YES    Patient's pre-procedure medications verified by Victoriano Renteria RN  .    Victoriano Renteria RN on 10/7/2021 at 7:43 AM

## 2021-10-11 ENCOUNTER — ANCILLARY PROCEDURE (OUTPATIENT)
Dept: ULTRASOUND IMAGING | Facility: CLINIC | Age: 51
End: 2021-10-11
Attending: SURGERY
Payer: COMMERCIAL

## 2021-10-11 ENCOUNTER — OFFICE VISIT (OUTPATIENT)
Dept: VASCULAR SURGERY | Facility: CLINIC | Age: 51
End: 2021-10-11
Attending: SURGERY
Payer: COMMERCIAL

## 2021-10-11 DIAGNOSIS — I83.812 VARICOSE VEINS OF LEFT LOWER EXTREMITY WITH PAIN: ICD-10-CM

## 2021-10-11 DIAGNOSIS — Z09 POSTOP CHECK: Primary | ICD-10-CM

## 2021-10-11 PROCEDURE — 93971 EXTREMITY STUDY: CPT | Mod: LT | Performed by: SURGERY

## 2021-10-11 PROCEDURE — 99207 PR NO CHARGE NURSE ONLY: CPT

## 2021-10-11 NOTE — LETTER
10/11/2021         RE: Lety Farley  [address]  [place] [address]        Dear Colleague,    Thank you for referring your patient, Lety Farley, to the [facility] [place]. Please see a copy of my visit note below.        Vein Clinic Postoperative Nurse Note    Patient is here for their 72 hour postoperative visit.    Procedure: Left leg VNUS closure GSV, ASV (med nec) 10-20 stab phlebs (med nec)   Procedure Date: 10/7/21  Surgeon: Dr. Farmer    Ultrasound Result: The left GSV is closed 19.1mm from the SFJ to the mid calf with evidence of thrombus throughout. The left AASV is closed 22.2mm from the SFJ to the upper/mid thigh with evidence of thrombus throughout. No evidence of LLE DVT.    Physical Exam: Incisions are approximated without signs of infection.  Ecchymosis: moderate  Swelling: minimal  Paresthesia: pt stated she does have numbness to her entire left lower leg, with the medial aspect being the worst. Pt stated she does have some numbness to her left lateral thigh as well    Patient Questions or Concerns: Pt is doing well and does not have any concerns. Pt aware the numbness is a common risk of the procedure. Denies pain.    Reviewed postoperative instructions with patient and provided them with written material of common things to expect from their procedure.     Patient's Next Vein Clinic Appointment: 6 week post op appt with Dr. Farmer (11/19/21).    Kathy Mcguire RN      Again, thank you for allowing me to participate in the care of your patient.        Sincerely,         Vein Nurse

## 2021-10-11 NOTE — PROGRESS NOTES
Vein Clinic Postoperative Nurse Note    Patient is here for their 72 hour postoperative visit.    Procedure: Left leg VNUS closure GSV, ASV ([facility]) 10-20 stab phlebs ([facility])   Procedure Date: 10/7/21  Surgeon: Dr. Farmer    Ultrasound Result: The left GSV is closed 19.1mm from the SFJ to the mid calf with evidence of thrombus throughout. The left AASV is closed 22.2mm from the SFJ to the upper/mid thigh with evidence of thrombus throughout. No evidence of LLE DVT.    Physical Exam: Incisions are approximated without signs of infection.  Ecchymosis: moderate  Swelling: minimal  Paresthesia: pt stated she does have numbness to her entire left lower leg, with the medial aspect being the worst. Pt stated she does have some numbness to her left lateral thigh as well    Patient Questions or Concerns: Pt is doing well and does not have any concerns. Pt aware the numbness is a common risk of the procedure. Denies pain.    Reviewed postoperative instructions with patient and provided them with written material of common things to expect from their procedure.     Patient's Next Vein Clinic Appointment: 6 week post op appt with Dr. Farmer (11/19/21).    Kathy Mcguire, RN

## 2021-10-26 ENCOUNTER — TELEPHONE (OUTPATIENT)
Dept: VASCULAR SURGERY | Facility: CLINIC | Age: 51
End: 2021-10-26

## 2021-10-26 NOTE — TELEPHONE ENCOUNTER
"    Vein Clinic - Nurse Triage Call    Situation: Pt calling c/o left ankle numbness and shooting pain to her left medial lower leg.    Background: Pt had vein procedure: Left leg VNUS closure GSV, ASV ([facility]) 10-20 stab phlebs ([facility]) done on 10/7/21.    RN noted on 10/11/21: \"pt stated she does have numbness to her entire left lower leg, with the medial aspect being the worst. Pt stated she does have some numbness to her left lateral thigh as well.\"    Assessment: Pt noted a little redness to her left medial calf, pt stated this area had a lot of bruising previously. Also, some ankle swelling. Pt stated the numbness and shooting pain she is having has actually improved a bit since last seen by RN on 10/11/21, but tends to feel \"different\" at times, such as \"tingling\" or a \"shock\" to the lower leg.     Pt stated it does tend to keep her up at night. Pt stated she continues to wear her compression hose, elevate her leg as needed, using cold packs, and taking ibuprofen as needed for her discomfort.    Recommendation: Informed pt these are common findings following her procedure. Reminded pt this nerve pain she is experiencing can take several weeks to months, up to a year, to resolve. The type of pain may change over time and be described as \"shooting\", \"zinging\", \"pins and needles\", a \"shock\" to the lower leg. Pt agreed to having all of these pain descriptions at one point or another.     Pt does not want to take anything for the nerve pain at this time. Instructed pt to try and give it another week or two and if the nerve pain becomes especially bothersome, to give us a call back, and Dr. Farmer could possibly prescribe a temporary nerve pain medication.    Reviewed postoperative information with patient. Patient is in agreement with plan and had no further questions.    Kathy Mcguire RN  "

## 2021-11-19 ENCOUNTER — OFFICE VISIT (OUTPATIENT)
Dept: VASCULAR SURGERY | Facility: CLINIC | Age: 51
End: 2021-11-19
Payer: COMMERCIAL

## 2021-11-19 DIAGNOSIS — I83.812 VARICOSE VEINS OF LEFT LOWER EXTREMITY WITH PAIN: Primary | ICD-10-CM

## 2021-11-19 PROCEDURE — 99207 PR VEINSOLUTIONS POST OPERATIVE VISIT: CPT | Performed by: SURGERY

## 2021-11-19 NOTE — LETTER
11/19/2021         RE: Lety Farley  [address]  [place] [address]        Dear Colleague,    Thank you for referring your patient, Lety Farley, to the [facility] [place]. Please see a copy of my visit note below.    [facility]in [facility] [place] 6-week postop visit  Cecilia Farley returns in follow-up of radiofrequency ablation of her left great saphenous vein, left anterior accessory saphenous vein and multiple phlebectomies.  The preoperative pain for which she presented is gone and she is very happy with that.  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  This is improving, however.    Exam  No residual varicose veins.  No significant residual ecchymosis.  All phlebectomy sites are healing well.    She has some numbness from the vein access site near the mid leg down to the anteromedial ankle.  There is a small 3 cm diameter area of mild dysesthesia in the mid anteromedial left leg.  This is much smaller than previously.    Impression  Overall she is doing well and the paresthesias/dysesthesias are improving.  She has been afraid to resume normal activities and a fear of injuring/damaging her leg from surgery.  I reassured her today that she should resume normal activities without any restrictions.  This will help with her healing.    Of asked her to give us a progress report on the numbness/dysesthesias in 3 months.  She will then return in 6 months for a left lower extremity venous ultrasound.    ELIJAH Farmer MD    Dictated using Dragon voice recognition software which may result in transcription errors      Again, thank you for allowing me to participate in the care of your patient.        Sincerely,        Kyler Farmer MD

## 2022-01-23 ENCOUNTER — HEALTH MAINTENANCE LETTER (OUTPATIENT)
Age: 52
End: 2022-01-23

## 2022-05-04 ENCOUNTER — OFFICE VISIT (OUTPATIENT)
Dept: VASCULAR SURGERY | Facility: CLINIC | Age: 52
End: 2022-05-04
Attending: SURGERY

## 2022-05-04 ENCOUNTER — ANCILLARY PROCEDURE (OUTPATIENT)
Dept: ULTRASOUND IMAGING | Facility: CLINIC | Age: 52
End: 2022-05-04
Attending: SURGERY
Payer: COMMERCIAL

## 2022-05-04 DIAGNOSIS — I83.812 VARICOSE VEINS OF LEFT LOWER EXTREMITY WITH PAIN: ICD-10-CM

## 2022-05-04 DIAGNOSIS — I83.812 VARICOSE VEINS OF LEFT LOWER EXTREMITY WITH PAIN: Primary | ICD-10-CM

## 2022-05-04 PROCEDURE — 99213 OFFICE O/P EST LOW 20 MIN: CPT | Performed by: SURGERY

## 2022-05-04 PROCEDURE — 93971 EXTREMITY STUDY: CPT | Mod: LT | Performed by: SURGERY

## 2022-05-04 NOTE — LETTER
5/4/2022         RE: Lety Farley  [address]  [place] [address]        Dear Colleague,    Thank you for referring your patient, Lety Farley, to the [facility] [place]. Please see a copy of my visit note below.    [facility] Vein [facility] long-term post VNUS Closure follow-up  Suly Ordonez returns in follow-up of radiofrequency ablation of her left great saphenous vein, left anterior accessory saphenous vein and phlebectomies on 10/7/2021.  Overall she is doing well though she does have complaints of left anterior thigh pain and recurrent/persistent varicose veins with mild, intermittent left ankle swelling.    Sure sure yes yes yes it looks aneurysm again this was most things where you got all these things, together the 2 veins, together and you probably have little bit more patulous at the origin and then it can elevate narrowing as it went down upon the    [facility] Vein [facility] long-term post VNUS Closure follow-up  Suly Farley returns in long-term follow-up of radiofrequency ablation of her left great saphenous vein, left anterior accessory saphenous vein and phlebectomies.  Overall she is doing well but she does have concerns of recurrent left proximal thigh pain and varicose veins.  These seem to have resolved for period of time after procedure but have recurred over the last 2 months.    Physical exam  Left lower extremity: 4 to 5 mm varicosities over the proximal anterior left thigh and distribution of the left anterior accessory saphenous vein.  No other significant varicose veins are appreciated.  Trace to 1+ left ankle edema.  No stasis changes.    Ultrasound     INDICATIONS:    Post VNUS Closure     EXAM TYPE  LEFT LOWER EXTREMITY VENOUS DUPLEX   6 MONTH POST VNUS CLOSURE  GSV AND AASV     TECHNICAL SUMMARY     Multiple transverse and longitudinal images of left lower extremity were obtained.     LEFT:       The CFV demonstrates phasic flow, compresses  and responds to augmentations.  No evidence of DVT at this time.  The remainder of deep veins including left femoral, popliteal, posterior tibial and peroneal veins are widely patent and fully compressible with no evidence for DVT at this time.     The GSV is closed  28.8 mm from the SFJ to the mid calf with evidence of thrombus seen throughout.     The AASV is compressible and appears patent.         FINAL SUMMARY:  1.         Left CFV is patent.  2.         The left GSV is closed 28.8 mm from the SFJ to the mid calf.  3.         The left AASV appears patent.     Assessment  Persistent left anterior thigh pain and varicose veins secondary to recanalized left anterior accessory saphenous vein.  We discussed options of continued conservative management with compression, leg elevation, and exercise and dietary measures.  She has pursued these measures and does not find it feasible to have a thigh-high stocking extend and remain at the level of the high thigh/groin.    Risks of conservative management include superficial thrombophlebitis and progression of the disease process.    She is a candidate for image guided sclerotherapy of the recanalized left anterior accessory saphenous vein and the tributary coursing from it.  Details of sclerotherapy including risks of allergic reaction, deep antibiosis, superficial thrombophlebitis and hyperpigmentation were discussed.  The patient voiced understanding and her questions were answered.    Plan  Possible ultrasound-guided, medically necessary sclerotherapy of her recanalized, symptomatic left anterior accessory saphenous vein and the tributaries coursing from it.    ELIJAH Farmer MD    Dictated using Dragon voice recognition software which may result in transcription errors          Again, thank you for allowing me to participate in the care of your patient.        Sincerely,        Kyler Farmer MD

## 2022-05-05 NOTE — NURSING NOTE
Patient Reported symptoms:    Right leg   Heaviness {Vein frequency of symptoms:779612}  Achiness {Vein frequency of symptoms:497528}  Swelling {Vein frequency of symptoms:165725}  Throbbing {Vein frequency of symptoms:818278}  Itching {Vein frequency of symptoms:264959}  Appearance {Vein Appearance:829698}  Impact on work/activities {Vein Impact on Work:296245}    Left Leg   Heaviness {Vein frequency of symptoms:592327}  Achiness {Vein frequency of symptoms:417149}  Swelling {Vein frequency of symptoms:605537}  Throbbing {Vein frequency of symptoms:468106}  Itching {Vein frequency of symptoms:105523}  Appearance {Vein Appearance:127631}  Impact on work/activities {Vein Impact on Work:954453}

## 2022-05-11 NOTE — PROGRESS NOTES
[facility] Vein Clinic [place] long-term post VNUS Closure follow-up  Suly Farley returns in long-term follow-up of radiofrequency ablation of her left great saphenous vein, left anterior accessory saphenous vein and phlebectomies.  Overall she is doing well but she does have concerns of recurrent left proximal thigh pain and varicose veins.  These seem to have resolved for period of time after procedure but have recurred over the last 2 months.    Physical exam  Left lower extremity: 4 to 5 mm varicosities over the proximal anterior left thigh and distribution of the left anterior accessory saphenous vein.  No other significant varicose veins are appreciated.  Trace to 1+ left ankle edema.  No stasis changes.    Ultrasound     INDICATIONS:    Post VNUS Closure     EXAM TYPE  LEFT LOWER EXTREMITY VENOUS DUPLEX   6 MONTH POST VNUS CLOSURE  GSV AND AASV     TECHNICAL SUMMARY     Multiple transverse and longitudinal images of left lower extremity were obtained.     LEFT:       The CFV demonstrates phasic flow, compresses and responds to augmentations.  No evidence of DVT at this time.  The remainder of deep veins including left femoral, popliteal, posterior tibial and peroneal veins are widely patent and fully compressible with no evidence for DVT at this time.     The GSV is closed  28.8 mm from the SFJ to the mid calf with evidence of thrombus seen throughout.     The AASV is compressible and appears patent.         FINAL SUMMARY:  1.         Left CFV is patent.  2.         The left GSV is closed 28.8 mm from the SFJ to the mid calf.  3.         The left AASV appears patent.     Assessment  Persistent left anterior thigh pain and varicose veins secondary to recanalized left anterior accessory saphenous vein.  We discussed options of continued conservative management with compression, leg elevation, and exercise and dietary measures.  She has pursued these measures and does not find it feasible to have a thigh-high  stocking extend and remain at the level of the high thigh/groin.    Risks of conservative management include superficial thrombophlebitis and progression of the disease process.    She is a candidate for image guided sclerotherapy of the recanalized left anterior accessory saphenous vein and the tributary coursing from it.  Details of sclerotherapy including risks of allergic reaction, deep antibiosis, superficial thrombophlebitis and hyperpigmentation were discussed.  The patient voiced understanding and her questions were answered.    Plan  Possible ultrasound-guided, medically necessary sclerotherapy of her recanalized, symptomatic left anterior accessory saphenous vein and the tributaries coursing from it.    ELIJAH Farmer MD    Dictated using Dragon voice recognition software which may result in transcription errors

## 2022-05-11 NOTE — PROGRESS NOTES
[facility] Vein Clinic [place] long-term post VNUS Closure follow-up  Suly Ordonez returns in follow-up of radiofrequency ablation of her left great saphenous vein, left anterior accessory saphenous vein and phlebectomies on 10/7/2021.  Overall she is doing well though she does have complaints of left anterior thigh pain and recurrent/persistent varicose veins with mild, intermittent left ankle swelling.    Sure sure yes yes yes it looks aneurysm again this was most things where you got all these things, together the 2 veins, together and you probably have little bit more patulous at the origin and then it can elevate narrowing as it went down upon the

## 2022-06-29 ENCOUNTER — TELEPHONE (OUTPATIENT)
Dept: VASCULAR SURGERY | Facility: CLINIC | Age: 52
End: 2022-06-29

## 2022-06-29 NOTE — TELEPHONE ENCOUNTER
Veins RN- please call Cecilia and evaluate status of left leg numbness noted at 6 wk po.    Pt last seen by CPN 5/4/22 in clinic  DOS 10/7/21 Lt GSV ASV and phlebs    Thanks

## 2022-08-02 ENCOUNTER — OFFICE VISIT (OUTPATIENT)
Dept: VASCULAR SURGERY | Facility: CLINIC | Age: 52
End: 2022-08-02

## 2022-08-02 DIAGNOSIS — I83.92 ASYMPTOMATIC VARICOSE VEINS OF LEFT LOWER EXTREMITY: Primary | ICD-10-CM

## 2022-08-02 PROCEDURE — 36468 NJX SCLRSNT SPIDER VEINS: CPT | Performed by: SURGERY

## 2022-08-02 PROCEDURE — S9999 SALES TAX: HCPCS | Performed by: SURGERY

## 2022-08-02 NOTE — LETTER
8/2/2022         RE: Lety Farley  [address]  [place] [address]        Dear Colleague,    Thank you for referring your patient, Lety Farley, to the [facility]IN [facility]IN[facility] [place]. Please see a copy of my visit note below.        Vein Clinic Sclerotherapy  Note     Indications:  Left leg pain and varicose veins secondary to recanalization of the left anterior accessory saphenous vein      Procedure:  1.  Ultrasound-guided sclerotherapy left anterior accessory saphenous vein  2.  Ultrasound-guided sclerotherapy left anterior sensory saphenous vein tributaries  3.  Ultrasound-guided sclerotherapy symptomatic, left lateral thigh varicose veins     Procedure description  The patient was found at her 6-month post procedure ultrasound to have a recanalized left anterior accessory saphenous vein.  Because of symptoms of warmth, aching and a numbness on the left anterior lateral thigh, the patient wished to have these treated.  Unfortunately, insurance would not approve medical necessary treatment, therefore the patient agreed to pay out-of-pocket for the treatment.    Details of procedure including risks of allergic reaction, deep vein thrombosis, ulceration, superficial thrombophlebitis and hyperpigmentation were discussed.  The patient voiced understanding and wished to proceed.  Informed consent was obtained.    I imaged the left anterior thigh with ultrasound and noted the patent left anterior accessory saphenous vein which broke through the fascia just distal to the mid thigh.  I traced the vein down to the mid thigh, accessed the vein on ultrasound guidance with a 27-gauge needle and injected 5 mL of 1% polidocanol foam and a one-point polidocanol to 4.0 mixture.  The vein went into tight spasm.  I had the patient perform ankle pumps.    I then imaged more distally on the thigh and noted a sizable tributary just cephalad of the knee that had not been filled with foam.  I isolated this  with ultrasound, placed a 27-gauge needle into it and injected 1% polidocanol foam using a total of 5 mL between 2 different locations.    Lastly, I imaged the left lateral thigh in an area of warmth and numbness for the patient, especially after standing for long periods of time.  I was surprised to see sizable, tortuous varicosities in this location extending somewhat cephalad and then distally and more anteromedially.  I isolated these veins just distal to the proximal third of the thigh and directed 27-gauge needle  Injected 1% polidocanol foam using a total of 2-1/2 mL of foam for this injection.    I had the patient perform ankle pumps.  We cleaned her leg, had her don thigh-high compression, then had a walk for 10 minutes.  She will return in 6 weeks in follow-up.    Sclerotherapy    Date/Time: 8/2/2022 11:46 AM  Performed by: Kyler Farmer MD  Authorized by: Kyler Farmer MD     Time out: Immediately prior to the procedure a time out was called    Type:  Cosmetic  Session:  Full  Procedure side:  Left  Solution/Amount:  1% POLIDOCANOL  Syringes:  3 (12 and half mL of 1% polidocanol foam  Patient tolerance:  Patient tolerated the procedure well with no immediate complications  Wrap/Hose:  Wolfgang Farmer MD    Dictated using Dragon voice recognition software which may result in transcription errors      Again, thank you for allowing me to participate in the care of your patient.        Sincerely,        Kyler Farmer MD

## 2022-08-02 NOTE — PROGRESS NOTES
Vein Clinic Sclerotherapy  Note     Indications:  Left leg pain and varicose veins secondary to recanalization of the left anterior accessory saphenous vein      Procedure:  1.  Ultrasound-guided sclerotherapy left anterior accessory saphenous vein  2.  Ultrasound-guided sclerotherapy left anterior sensory saphenous vein tributaries  3.  Ultrasound-guided sclerotherapy symptomatic, left lateral thigh varicose veins     Procedure description  The patient was found at her 6-month post procedure ultrasound to have a recanalized left anterior accessory saphenous vein.  Because of symptoms of warmth, aching and a numbness on the left anterior lateral thigh, the patient wished to have these treated.  Unfortunately, insurance would not approve medical necessary treatment, therefore the patient agreed to pay out-of-pocket for the treatment.    Details of procedure including risks of allergic reaction, deep vein thrombosis, ulceration, superficial thrombophlebitis and hyperpigmentation were discussed.  The patient voiced understanding and wished to proceed.  Informed consent was obtained.    I imaged the left anterior thigh with ultrasound and noted the patent left anterior accessory saphenous vein which broke through the fascia just distal to the mid thigh.  I traced the vein down to the mid thigh, accessed the vein on ultrasound guidance with a 27-gauge needle and injected 5 mL of 1% polidocanol foam and a one-point polidocanol to 4.0 mixture.  The vein went into tight spasm.  I had the patient perform ankle pumps.    I then imaged more distally on the thigh and noted a sizable tributary just cephalad of the knee that had not been filled with foam.  I isolated this with ultrasound, placed a 27-gauge needle into it and injected 1% polidocanol foam using a total of 5 mL between 2 different locations.    Lastly, I imaged the left lateral thigh in an area of warmth and numbness for the patient, especially after standing  for long periods of time.  I was surprised to see sizable, tortuous varicosities in this location extending somewhat cephalad and then distally and more anteromedially.  I isolated these veins just distal to the proximal third of the thigh and directed 27-gauge needle  Injected 1% polidocanol foam using a total of 2-1/2 mL of foam for this injection.    I had the patient perform ankle pumps.  We cleaned her leg, had her don thigh-high compression, then had a walk for 10 minutes.  She will return in 6 weeks in follow-up.    Sclerotherapy    Date/Time: 8/2/2022 11:46 AM  Performed by: Kyler Farmer MD  Authorized by: Kyler Farmer MD     Time out: Immediately prior to the procedure a time out was called    Type:  Cosmetic  Session:  Full  Procedure side:  Left  Solution/Amount:  1% POLIDOCANOL  Syringes:  3 (12 and half mL of 1% polidocanol foam  Patient tolerance:  Patient tolerated the procedure well with no immediate complications  Wrap/Hose:  Wolfgang Farmer MD    Dictated using Dragon voice recognition software which may result in transcription errors

## 2022-09-04 ENCOUNTER — HEALTH MAINTENANCE LETTER (OUTPATIENT)
Age: 52
End: 2022-09-04

## 2022-10-18 ENCOUNTER — OFFICE VISIT (OUTPATIENT)
Dept: VASCULAR SURGERY | Facility: CLINIC | Age: 52
End: 2022-10-18
Payer: COMMERCIAL

## 2022-10-18 DIAGNOSIS — I83.812 VARICOSE VEINS OF LEFT LOWER EXTREMITY WITH PAIN: Primary | ICD-10-CM

## 2022-10-18 PROCEDURE — 99213 OFFICE O/P EST LOW 20 MIN: CPT | Performed by: SURGERY

## 2022-10-18 RX ORDER — SPIRONOLACTONE 50 MG/1
50 TABLET, FILM COATED ORAL DAILY
COMMUNITY
Start: 2022-09-09

## 2022-10-18 NOTE — LETTER
"    10/18/2022         RE: Lety Farley  [address]  [place] [address]        Dear Colleague,    Thank you for referring your patient, Lety Farley, to the [facility] VEIN CLIN[facility] [place]. Please see a copy of my visit note below.    [facility] Vein [facility] office note  Lety Farley returns in follow-up of ultrasound-guided sclerotherapy of her left anterior accessory saphenous vein and left anterior accessory saphenous vein tributaries on 8/2/2022.  She states that right thigh pain she was experiencing prior to treatment has dissipated and is no longer present.  She admits to some tenderness along the course of the injected veins but says this is improved significant.    She does admit to some continued \"warmth\" in the distal anterior left thigh and the distal anterolateral left thigh.    Had the patient stand and examined her left lower extremity.  I could not identify significant, bulging varicose veins on her left distal anterior and anterolateral thigh.  There was an area of induration in the proximal anterolateral thigh, the site of sclerotherapy.    Ultrasound  I had the patient lie supine on our examining table and imaged the left lower extremity.  I noted that the left anterior accessory saphenous vein was noncompressible as was the left great saphenous vein.  The tributaries coursing from the left anterior extensor saphenous vein to the anteromedial left thigh were noncompressible.  I imaged the proximal anterolateral left thigh and noted that there was a nest of varicosities that were noncompressible.    Careful inspection of the distal anterior and anterolateral left leg did not reveal any significant, compressible veins, therefore no sclerotherapy was indicated.    Assessment/plan  Good result following 1 session of sclerotherapy to the left anterior sensory saphenous vein and left anterior accessory saphenous vein tributaries.  I explained that the areas of firmness in the " proximal anterolateral left thigh will take up to a year to fully heal.    The patient will return on an as-needed basis.  She is very happy with the results and improvement in her pain.    ELIJAH Farmer MD, FACS    Dictated using Dragon voice recognition software which may result in transcription errors      Again, thank you for allowing me to participate in the care of your patient.        Sincerely,        Kyler Farmer MD

## 2022-10-19 NOTE — PROGRESS NOTES
"[facility] Vein Clinic [place] office note  Lety Farley returns in follow-up of ultrasound-guided sclerotherapy of her left anterior accessory saphenous vein and left anterior accessory saphenous vein tributaries on 8/2/2022.  She states that right thigh pain she was experiencing prior to treatment has dissipated and is no longer present.  She admits to some tenderness along the course of the injected veins but says this is improved significant.    She does admit to some continued \"warmth\" in the distal anterior left thigh and the distal anterolateral left thigh.    Had the patient stand and examined her left lower extremity.  I could not identify significant, bulging varicose veins on her left distal anterior and anterolateral thigh.  There was an area of induration in the proximal anterolateral thigh, the site of sclerotherapy.    Ultrasound  I had the patient lie supine on our examining table and imaged the left lower extremity.  I noted that the left anterior accessory saphenous vein was noncompressible as was the left great saphenous vein.  The tributaries coursing from the left anterior extensor saphenous vein to the anteromedial left thigh were noncompressible.  I imaged the proximal anterolateral left thigh and noted that there was a nest of varicosities that were noncompressible.    Careful inspection of the distal anterior and anterolateral left leg did not reveal any significant, compressible veins, therefore no sclerotherapy was indicated.    Assessment/plan  Good result following 1 session of sclerotherapy to the left anterior sensory saphenous vein and left anterior accessory saphenous vein tributaries.  I explained that the areas of firmness in the proximal anterolateral left thigh will take up to a year to fully heal.    The patient will return on an as-needed basis.  She is very happy with the results and improvement in her pain.    C Rica Farmer MD, FACS    Dictated using Dragon voice " recognition software which may result in transcription errors

## 2022-12-29 ENCOUNTER — ANCILLARY PROCEDURE (OUTPATIENT)
Dept: MAMMOGRAPHY | Facility: CLINIC | Age: 52
End: 2022-12-29
Attending: FAMILY MEDICINE
Payer: COMMERCIAL

## 2022-12-29 DIAGNOSIS — Z12.31 VISIT FOR SCREENING MAMMOGRAM: ICD-10-CM

## 2022-12-29 PROCEDURE — 77067 SCR MAMMO BI INCL CAD: CPT | Mod: TC | Performed by: STUDENT IN AN ORGANIZED HEALTH CARE EDUCATION/TRAINING PROGRAM

## 2023-04-29 ENCOUNTER — HEALTH MAINTENANCE LETTER (OUTPATIENT)
Age: 53
End: 2023-04-29

## 2024-07-07 ENCOUNTER — HEALTH MAINTENANCE LETTER (OUTPATIENT)
Age: 54
End: 2024-07-07

## 2025-03-09 ENCOUNTER — HEALTH MAINTENANCE LETTER (OUTPATIENT)
Age: 55
End: 2025-03-09

## 2025-07-13 ENCOUNTER — HEALTH MAINTENANCE LETTER (OUTPATIENT)
Age: 55
End: 2025-07-13